# Patient Record
Sex: MALE | Race: WHITE | URBAN - METROPOLITAN AREA
[De-identification: names, ages, dates, MRNs, and addresses within clinical notes are randomized per-mention and may not be internally consistent; named-entity substitution may affect disease eponyms.]

---

## 2024-01-04 ENCOUNTER — OFFICE (OUTPATIENT)
Dept: URBAN - METROPOLITAN AREA CLINIC 8 | Facility: CLINIC | Age: 34
End: 2024-01-04

## 2024-01-04 VITALS
SYSTOLIC BLOOD PRESSURE: 150 MMHG | WEIGHT: 221 LBS | HEART RATE: 84 BPM | HEIGHT: 66 IN | DIASTOLIC BLOOD PRESSURE: 101 MMHG

## 2024-01-04 DIAGNOSIS — K64.5 PERIANAL VENOUS THROMBOSIS: ICD-10-CM

## 2024-01-04 DIAGNOSIS — K62.89 OTHER SPECIFIED DISEASES OF ANUS AND RECTUM: ICD-10-CM

## 2024-01-04 DIAGNOSIS — R03.0 ELEVATED BLOOD-PRESSURE READING, WITHOUT DIAGNOSIS OF HYPERT: ICD-10-CM

## 2024-01-04 PROCEDURE — 99203 OFFICE O/P NEW LOW 30 MIN: CPT | Performed by: NURSE PRACTITIONER

## 2024-01-04 RX ORDER — METRONIDAZOLE 250 MG/1
TABLET, FILM COATED ORAL
Qty: 40 | Refills: 0 | Status: ACTIVE
Start: 2024-01-04

## 2024-01-04 NOTE — SERVICEHPINOTES
Mr. Inman presents today with chief complaint of rectal pain and a protrusion that began 4 days ago. He was seen in the ER on 1/1/24 and was given Proctofoam and stool softeners and told to be seen by Dr. Ibarra. He reports that he has an appt with Dr. Ibarra on 2/22/24 for further evaluation/treatment but states that he is still having a lot of pain and minimal relief with Proctofoam. He denies any hematochezia or rectal bleeding. He denies any constipation or diarrhea. He reports that he has recently been hunting over the past few weeks and has been sitting a deer stand for multiple hours per day. He denies any abdominal pain. He denies any family history of colon cancer or colon polyps.

## 2024-02-29 ENCOUNTER — OFFICE (OUTPATIENT)
Dept: URBAN - METROPOLITAN AREA CLINIC 8 | Facility: CLINIC | Age: 34
End: 2024-02-29